# Patient Record
Sex: FEMALE | Race: WHITE
[De-identification: names, ages, dates, MRNs, and addresses within clinical notes are randomized per-mention and may not be internally consistent; named-entity substitution may affect disease eponyms.]

---

## 2019-01-29 ENCOUNTER — HOSPITAL ENCOUNTER (OUTPATIENT)
Dept: HOSPITAL 19 - COL.LAB | Age: 54
End: 2019-01-29
Attending: INTERNAL MEDICINE
Payer: OTHER GOVERNMENT

## 2019-01-29 DIAGNOSIS — E03.9: Primary | ICD-10-CM

## 2019-01-29 LAB — TSH SERPL DL<=0.005 MIU/L-ACNC: 0.03 UIU/ML (ref 0.47–4.68)

## 2019-04-03 ENCOUNTER — HOSPITAL ENCOUNTER (OUTPATIENT)
Dept: HOSPITAL 19 - COL.LAB | Age: 54
End: 2019-04-03
Attending: INTERNAL MEDICINE
Payer: OTHER GOVERNMENT

## 2019-04-03 DIAGNOSIS — E03.9: Primary | ICD-10-CM

## 2019-04-03 LAB — TSH SERPL DL<=0.005 MIU/L-ACNC: 1.97 UIU/ML (ref 0.47–4.68)

## 2023-04-30 ENCOUNTER — HOSPITAL ENCOUNTER (INPATIENT)
Dept: HOSPITAL 19 - MEDICAL | Age: 58
LOS: 4 days | Discharge: HOME | DRG: 683 | End: 2023-05-04
Attending: INTERNAL MEDICINE | Admitting: INTERNAL MEDICINE
Payer: OTHER GOVERNMENT

## 2023-04-30 VITALS — TEMPERATURE: 98.3 F | SYSTOLIC BLOOD PRESSURE: 150 MMHG | DIASTOLIC BLOOD PRESSURE: 67 MMHG | HEART RATE: 71 BPM

## 2023-04-30 VITALS — BODY MASS INDEX: 34.25 KG/M2 | WEIGHT: 188.5 LBS | HEIGHT: 62.01 IN

## 2023-04-30 DIAGNOSIS — E66.9: ICD-10-CM

## 2023-04-30 DIAGNOSIS — E03.9: ICD-10-CM

## 2023-04-30 DIAGNOSIS — E78.5: ICD-10-CM

## 2023-04-30 DIAGNOSIS — Z87.01: ICD-10-CM

## 2023-04-30 DIAGNOSIS — T39.395A: ICD-10-CM

## 2023-04-30 DIAGNOSIS — Z79.890: ICD-10-CM

## 2023-04-30 DIAGNOSIS — E87.1: ICD-10-CM

## 2023-04-30 DIAGNOSIS — E83.39: ICD-10-CM

## 2023-04-30 DIAGNOSIS — I10: ICD-10-CM

## 2023-04-30 DIAGNOSIS — T39.315A: ICD-10-CM

## 2023-04-30 DIAGNOSIS — E11.9: ICD-10-CM

## 2023-04-30 DIAGNOSIS — N17.0: Primary | ICD-10-CM

## 2023-04-30 DIAGNOSIS — E86.1: ICD-10-CM

## 2023-04-30 LAB
ALBUMIN SERPL-MCNC: 3.2 GM/DL (ref 3.5–5)
ANION GAP SERPL CALC-SCNC: 12 MMOL/L (ref 7–16)
BUN SERPL-MCNC: 26 MG/DL (ref 10–20)
CALCIUM SERPL-MCNC: 8.8 MG/DL (ref 8.4–10.2)
CHLORIDE SERPL-SCNC: 103 MMOL/L (ref 98–107)
CO2 SERPL-SCNC: 20 MMOL/L (ref 22–29)
CREAT SERPL-SCNC: 3.96 MG/DL (ref 0.57–1.11)
GLUCOSE SERPL-MCNC: 118 MG/DL (ref 70–99)
MAGNESIUM SERPL-MCNC: 1.8 MG/DL (ref 1.6–2.6)
PHOSPHATE SERPL-MCNC: 5.3 MG/DL (ref 2.3–4.7)
POTASSIUM SERPL-SCNC: 4.3 MMOL/L (ref 3.5–4.5)
SODIUM SERPL-SCNC: 135 MMOL/L (ref 136–145)

## 2023-04-30 PROCEDURE — G0379 DIRECT REFER HOSPITAL OBSERV: HCPCS

## 2023-05-01 VITALS — SYSTOLIC BLOOD PRESSURE: 135 MMHG | HEART RATE: 65 BPM | TEMPERATURE: 97.6 F | DIASTOLIC BLOOD PRESSURE: 63 MMHG

## 2023-05-01 VITALS — TEMPERATURE: 97.8 F | HEART RATE: 72 BPM | DIASTOLIC BLOOD PRESSURE: 66 MMHG | SYSTOLIC BLOOD PRESSURE: 141 MMHG

## 2023-05-01 VITALS — SYSTOLIC BLOOD PRESSURE: 136 MMHG | TEMPERATURE: 98.1 F | DIASTOLIC BLOOD PRESSURE: 61 MMHG | HEART RATE: 66 BPM

## 2023-05-01 VITALS — TEMPERATURE: 97.9 F | HEART RATE: 67 BPM | SYSTOLIC BLOOD PRESSURE: 131 MMHG | DIASTOLIC BLOOD PRESSURE: 56 MMHG

## 2023-05-01 VITALS — TEMPERATURE: 98 F | HEART RATE: 74 BPM | DIASTOLIC BLOOD PRESSURE: 72 MMHG | SYSTOLIC BLOOD PRESSURE: 149 MMHG

## 2023-05-01 VITALS — SYSTOLIC BLOOD PRESSURE: 132 MMHG | HEART RATE: 72 BPM | DIASTOLIC BLOOD PRESSURE: 59 MMHG | TEMPERATURE: 98.1 F

## 2023-05-01 VITALS — SYSTOLIC BLOOD PRESSURE: 141 MMHG

## 2023-05-01 VITALS — SYSTOLIC BLOOD PRESSURE: 150 MMHG

## 2023-05-01 VITALS — SYSTOLIC BLOOD PRESSURE: 135 MMHG

## 2023-05-01 VITALS — SYSTOLIC BLOOD PRESSURE: 132 MMHG

## 2023-05-01 LAB
ANION GAP SERPL CALC-SCNC: 9 MMOL/L (ref 7–16)
BASOPHILS # BLD: 0.1 K/MM3 (ref 0–0.2)
BASOPHILS NFR BLD AUTO: 0.9 % (ref 0–2)
BUN SERPL-MCNC: 26 MG/DL (ref 10–20)
CALCIUM SERPL-MCNC: 8.4 MG/DL (ref 8.4–10.2)
CHLORIDE SERPL-SCNC: 106 MMOL/L (ref 98–107)
CO2 SERPL-SCNC: 22 MMOL/L (ref 22–29)
CREAT SERPL-SCNC: 3.84 MG/DL (ref 0.57–1.11)
CREAT SERPL-SCNC: 4.01 MG/DL (ref 0.57–1.11)
EOSINOPHIL # BLD: 0.7 K/MM3 (ref 0–0.7)
EOSINOPHIL NFR BLD: 9.1 % (ref 0–4)
ERYTHROCYTE [DISTWIDTH] IN BLOOD BY AUTOMATED COUNT: 12 % (ref 11.5–14.5)
FRACT EXCRET NA UR+SERPL-RTO: 2.26 %
GLUCOSE SERPL-MCNC: 102 MG/DL (ref 70–99)
GRANULOCYTES # BLD AUTO: 46 % (ref 42.2–75.2)
HCT VFR BLD AUTO: 32.7 % (ref 37–47)
HGB BLD-MCNC: 11.6 G/DL (ref 12.5–16)
LYMPHOCYTES # BLD: 2.5 K/MM3 (ref 1.2–3.4)
LYMPHOCYTES NFR BLD: 33.9 % (ref 20–51)
MCH RBC QN AUTO: 30 PG (ref 27–31)
MCHC RBC AUTO-ENTMCNC: 36 G/DL (ref 33–37)
MCV RBC AUTO: 84 FL (ref 80–100)
MONOCYTES # BLD: 0.7 K/MM3 (ref 0.1–0.6)
MONOCYTES NFR BLD AUTO: 8.9 % (ref 1.7–9.3)
NEUTROPHILS # BLD: 3.4 K/MM3 (ref 1.4–6.5)
PH UR STRIP.AUTO: 5 [PH] (ref 5–8.5)
PLATELET # BLD AUTO: 196 K/MM3 (ref 130–400)
PMV BLD AUTO: 10.3 FL (ref 7.4–10.4)
POTASSIUM SERPL-SCNC: 4 MMOL/L (ref 3.5–4.5)
RBC # BLD AUTO: 3.91 M/MM3 (ref 4.1–5.3)
RBC # UR STRIP.AUTO: (no result) /UL
RBC # UR: (no result) /HPF (ref 0–2)
SODIUM SERPL-SCNC: 137 MMOL/L (ref 136–145)
SODIUM SERPL-SCNC: 139 MMOL/L (ref 136–145)
SP GR UR STRIP.AUTO: 1.01 (ref 1–1.03)
SQUAMOUS # URNS: (no result) /HPF (ref 0–10)
URN COLLECT METHOD CLASS: (no result)
UROBILINOGEN UR STRIP.AUTO-MCNC: 0.2 E.U/DL (ref 0.2–1)
WBC # UR: (no result) /HPF (ref 0–2)

## 2023-05-01 NOTE — NUR
Patient resting in bed with minimal complaints today. Nausea improved as the
day progressed. She tolerated lunch and dinner. Ivf as ordered. Labs collected
as ordered. Minmal needs. Report to ailyn

## 2023-05-01 NOTE — NUR
PT. STATED THAT SHE WAS STILL HAVING SOME NAUSEA WHEN I WENT IN TO CHECK ON
HER, I LET HER KNOW THAT I COULD GET HER A DOSE OF IV ZOFRAN TO HELP WITH THE
NAUSEA, AFTER GIVING HERTHE DOSE I INFORMED HER THAT IT WOULD TAKE BETWEEN
15-20 MIN TO KICK IN, AND THAT I WOULD CHECK ON HER IN A LITTLE BIT TO SEE IF
IT HAD BEEN EFFECTIVE, BUT THAT IF SHE WAS STILL HAVING NAUSEA AT THAT TIME I
COULD CHECK WITH THE  ON CALL TONIGHT TO SEE ABOUT ORDERING A DIFFERENT
TYPE OF NAUSEA MEDICINE, THAT WAY WE WOULD BE ABLE TO ALTERNATE NAUSEA MEDS,
DESPITE NAUSEA PT. STATES SHE HASN'T BEEN ABLE TO THROW UP, WILL CONTINUE TO
MONITOR.

## 2023-05-01 NOTE — NUR
Patient resting in bed. attempting to eat toast, but reports nausea. Zofran
given for nausea. She also reports a headache, tylenol PRN given. We discussed
strict I&O due to her kidney function, she verbalized understanding. Gi sample
sent to lab by [facility]. Will monitor

## 2023-05-01 NOTE — NUR
Initial visit; Patient thanked  for looking in on her and offering
God's blessings though patient declined spiritual care.  joked with
her about her funny shirt and wished her well.

## 2023-05-01 NOTE — NUR
ANAND met with the patient to discuss discharge plan. The patient lives in [place]
with her , Simone (ph#[phone]). She reports independence with
ADLs and does not have any DME. The patient's PCP is Dr. Duran at [facility]
and she obtains her medications at [facility]. The patient does not have a DPOA-HC,
but she was interested in obtaining a form. ANAND provided. The patient plans to
return home with her  upon discharge. No additional needs at this time.
 
*Discharge plan: home with *

## 2023-05-02 VITALS — SYSTOLIC BLOOD PRESSURE: 129 MMHG | TEMPERATURE: 97.8 F | DIASTOLIC BLOOD PRESSURE: 63 MMHG | HEART RATE: 67 BPM

## 2023-05-02 VITALS — DIASTOLIC BLOOD PRESSURE: 70 MMHG | SYSTOLIC BLOOD PRESSURE: 149 MMHG | TEMPERATURE: 97.9 F | HEART RATE: 69 BPM

## 2023-05-02 VITALS — SYSTOLIC BLOOD PRESSURE: 134 MMHG

## 2023-05-02 VITALS — SYSTOLIC BLOOD PRESSURE: 134 MMHG | HEART RATE: 67 BPM | TEMPERATURE: 98.4 F | DIASTOLIC BLOOD PRESSURE: 59 MMHG

## 2023-05-02 VITALS — DIASTOLIC BLOOD PRESSURE: 78 MMHG | TEMPERATURE: 98.4 F | HEART RATE: 81 BPM | SYSTOLIC BLOOD PRESSURE: 152 MMHG

## 2023-05-02 VITALS — HEART RATE: 68 BPM | SYSTOLIC BLOOD PRESSURE: 154 MMHG | DIASTOLIC BLOOD PRESSURE: 80 MMHG | TEMPERATURE: 97.8 F

## 2023-05-02 VITALS — SYSTOLIC BLOOD PRESSURE: 152 MMHG

## 2023-05-02 VITALS — HEART RATE: 65 BPM | SYSTOLIC BLOOD PRESSURE: 148 MMHG | DIASTOLIC BLOOD PRESSURE: 66 MMHG | TEMPERATURE: 97.9 F

## 2023-05-02 VITALS — SYSTOLIC BLOOD PRESSURE: 149 MMHG | TEMPERATURE: 97.9 F | DIASTOLIC BLOOD PRESSURE: 70 MMHG | HEART RATE: 69 BPM

## 2023-05-02 VITALS — SYSTOLIC BLOOD PRESSURE: 149 MMHG

## 2023-05-02 VITALS — SYSTOLIC BLOOD PRESSURE: 148 MMHG

## 2023-05-02 LAB
ANION GAP SERPL CALC-SCNC: 9 MMOL/L (ref 7–16)
BASOPHILS # BLD: 0.1 K/MM3 (ref 0–0.2)
BASOPHILS NFR BLD AUTO: 1 % (ref 0–2)
BUN SERPL-MCNC: 23 MG/DL (ref 10–20)
CALCIUM SERPL-MCNC: 8.4 MG/DL (ref 8.4–10.2)
CHLORIDE SERPL-SCNC: 110 MMOL/L (ref 98–107)
CO2 SERPL-SCNC: 20 MMOL/L (ref 22–29)
CREAT SERPL-SCNC: 3.54 MG/DL (ref 0.57–1.11)
EOSINOPHIL # BLD: 0.4 K/MM3 (ref 0–0.7)
EOSINOPHIL NFR BLD: 6.4 % (ref 0–4)
ERYTHROCYTE [DISTWIDTH] IN BLOOD BY AUTOMATED COUNT: 12.2 % (ref 11.5–14.5)
GLUCOSE SERPL-MCNC: 97 MG/DL (ref 70–99)
GRANULOCYTES # BLD AUTO: 47.8 % (ref 42.2–75.2)
HCT VFR BLD AUTO: 31.1 % (ref 37–47)
HGB BLD-MCNC: 10.7 G/DL (ref 12.5–16)
LYMPHOCYTES # BLD: 2.2 K/MM3 (ref 1.2–3.4)
LYMPHOCYTES NFR BLD: 35.2 % (ref 20–51)
MCH RBC QN AUTO: 29 PG (ref 27–31)
MCHC RBC AUTO-ENTMCNC: 34 G/DL (ref 33–37)
MCV RBC AUTO: 84 FL (ref 80–100)
MONOCYTES # BLD: 0.5 K/MM3 (ref 0.1–0.6)
MONOCYTES NFR BLD AUTO: 8.5 % (ref 1.7–9.3)
NEUTROPHILS # BLD: 3 K/MM3 (ref 1.4–6.5)
PLATELET # BLD AUTO: 173 K/MM3 (ref 130–400)
PMV BLD AUTO: 10.4 FL (ref 7.4–10.4)
POTASSIUM SERPL-SCNC: 4.3 MMOL/L (ref 3.5–4.5)
RBC # BLD AUTO: 3.69 M/MM3 (ref 4.1–5.3)
SODIUM SERPL-SCNC: 139 MMOL/L (ref 136–145)

## 2023-05-02 NOTE — NUR
PATIENT ALERT AND ORIENTED X4. VSS. PATIENT HERE FOR ZAINAB/PNA/N/V. PATIENT
DENIES ANY PAIN AT THIS TIME. IV TO RIGHT AC WITH NS RUNNING AT 125ML/HOUR.
PATIENT IN BED WITH CALL LIGHT NEAR.

## 2023-05-03 VITALS — HEART RATE: 76 BPM | DIASTOLIC BLOOD PRESSURE: 66 MMHG | TEMPERATURE: 97.8 F | SYSTOLIC BLOOD PRESSURE: 154 MMHG

## 2023-05-03 VITALS — HEART RATE: 69 BPM | SYSTOLIC BLOOD PRESSURE: 148 MMHG | TEMPERATURE: 97.9 F | DIASTOLIC BLOOD PRESSURE: 60 MMHG

## 2023-05-03 VITALS — DIASTOLIC BLOOD PRESSURE: 65 MMHG | SYSTOLIC BLOOD PRESSURE: 148 MMHG | HEART RATE: 72 BPM | TEMPERATURE: 98.1 F

## 2023-05-03 VITALS — HEART RATE: 73 BPM | DIASTOLIC BLOOD PRESSURE: 79 MMHG | SYSTOLIC BLOOD PRESSURE: 177 MMHG | TEMPERATURE: 97.9 F

## 2023-05-03 VITALS — DIASTOLIC BLOOD PRESSURE: 82 MMHG | SYSTOLIC BLOOD PRESSURE: 157 MMHG

## 2023-05-03 VITALS — HEART RATE: 71 BPM | DIASTOLIC BLOOD PRESSURE: 77 MMHG | SYSTOLIC BLOOD PRESSURE: 165 MMHG | TEMPERATURE: 98 F

## 2023-05-03 VITALS — DIASTOLIC BLOOD PRESSURE: 70 MMHG | SYSTOLIC BLOOD PRESSURE: 137 MMHG | TEMPERATURE: 98 F | HEART RATE: 78 BPM

## 2023-05-03 VITALS — SYSTOLIC BLOOD PRESSURE: 154 MMHG

## 2023-05-03 VITALS — SYSTOLIC BLOOD PRESSURE: 177 MMHG

## 2023-05-03 VITALS — SYSTOLIC BLOOD PRESSURE: 152 MMHG

## 2023-05-03 LAB
ANION GAP SERPL CALC-SCNC: 9 MMOL/L (ref 7–16)
BUN SERPL-MCNC: 17 MG/DL (ref 10–20)
CALCIUM SERPL-MCNC: 8.7 MG/DL (ref 8.4–10.2)
CHLORIDE SERPL-SCNC: 111 MMOL/L (ref 98–107)
CO2 SERPL-SCNC: 19 MMOL/L (ref 22–29)
CREAT SERPL-SCNC: 2.57 MG/DL (ref 0.57–1.11)
EOSINOPHIL NFR BLD: 2 % (ref 0–4)
ERYTHROCYTE [DISTWIDTH] IN BLOOD BY AUTOMATED COUNT: 12.2 % (ref 11.5–14.5)
GLUCOSE SERPL-MCNC: 94 MG/DL (ref 70–99)
HCT VFR BLD AUTO: 33.4 % (ref 37–47)
HGB BLD-MCNC: 11.4 G/DL (ref 12.5–16)
LYMPHOCYTES NFR BLD MANUAL: 29 % (ref 20–51)
MCH RBC QN AUTO: 29 PG (ref 27–31)
MCHC RBC AUTO-ENTMCNC: 34 G/DL (ref 33–37)
MCV RBC AUTO: 85 FL (ref 80–100)
MONOCYTES NFR BLD: 4 % (ref 1.7–9.3)
NEUTS SEG NFR BLD MANUAL: 65 % (ref 42–75.2)
PLATELET # BLD AUTO: 193 K/MM3 (ref 130–400)
PLATELET BLD QL SMEAR: NORMAL
PMV BLD AUTO: 10.7 FL (ref 7.4–10.4)
POTASSIUM SERPL-SCNC: 4.3 MMOL/L (ref 3.5–4.5)
RBC # BLD AUTO: 3.92 M/MM3 (ref 4.1–5.3)
SODIUM SERPL-SCNC: 139 MMOL/L (ref 136–145)

## 2023-05-03 NOTE — NUR
PT SITTING UP IN RECLINER. VISITING WITH FRIEND.  DENIES COUGH, PAIN OR
DISTRESS. CALL LIGHT IN REACH.

## 2023-05-03 NOTE — NUR
05/02 2212 PT. RESTING COMFORTABLY IN BED, ASKED HER IF ANYONE HAD EXPLAINED
TO HER WHY HER HOME MEDICATIONS WERE ON HOLD, INFORMED HER ABOUT METFORMIN,
AND KIDNEY FUNCTION, AND THAT THEY ARE MOST LIKELY HOLDING THAT AND HER OTHER
HOME MEDS WILE WE ARE WAITING FOR HER KIDNEY LABS TO CONTINUE TO TREND
DOWNWARD, PT. STATED UNDERSTANDING, WILL CONTINUE TO MONITOR.

## 2023-05-03 NOTE — NUR
PATIENT ALERT AND ORIENTED X4. VSS. PATIENT HERE FOR ZAINAB/PNA/N/V. PATIENT
DENIES ANY PAIN, N/V. IV TO RIGHT AC WITH NS RUNNING AT 100ML/HOUR. LUNGS CTA.
PATIENT ON ROOM AIR. PATIENT EATING/DRINKING AND VOIDING WITHOUT ISSUES. CALL
LIGHT IN REACH .

## 2023-05-04 VITALS — SYSTOLIC BLOOD PRESSURE: 136 MMHG | DIASTOLIC BLOOD PRESSURE: 70 MMHG | HEART RATE: 70 BPM | TEMPERATURE: 98 F

## 2023-05-04 VITALS — SYSTOLIC BLOOD PRESSURE: 136 MMHG

## 2023-05-04 VITALS — SYSTOLIC BLOOD PRESSURE: 184 MMHG

## 2023-05-04 VITALS — TEMPERATURE: 98.4 F | HEART RATE: 74 BPM | DIASTOLIC BLOOD PRESSURE: 73 MMHG | SYSTOLIC BLOOD PRESSURE: 184 MMHG

## 2023-05-04 VITALS — SYSTOLIC BLOOD PRESSURE: 137 MMHG

## 2023-05-04 LAB
ANION GAP SERPL CALC-SCNC: 8 MMOL/L (ref 7–16)
BASOPHILS NFR BLD MANUAL: 1 % (ref 0–2)
BUN SERPL-MCNC: 15 MG/DL (ref 10–20)
CALCIUM SERPL-MCNC: 8.8 MG/DL (ref 8.4–10.2)
CHLORIDE SERPL-SCNC: 109 MMOL/L (ref 98–107)
CO2 SERPL-SCNC: 23 MMOL/L (ref 22–29)
CREAT SERPL-SCNC: 2.01 MG/DL (ref 0.57–1.11)
EOSINOPHIL NFR BLD: 13 % (ref 0–4)
ERYTHROCYTE [DISTWIDTH] IN BLOOD BY AUTOMATED COUNT: 12.1 % (ref 11.5–14.5)
GLUCOSE SERPL-MCNC: 95 MG/DL (ref 70–99)
HCT VFR BLD AUTO: 30.9 % (ref 37–47)
HGB BLD-MCNC: 10.6 G/DL (ref 12.5–16)
LYMPHOCYTES NFR BLD MANUAL: 25 % (ref 20–51)
MCH RBC QN AUTO: 29 PG (ref 27–31)
MCHC RBC AUTO-ENTMCNC: 34 G/DL (ref 33–37)
MCV RBC AUTO: 84 FL (ref 80–100)
MONOCYTES NFR BLD: 6 % (ref 1.7–9.3)
NEUTS BAND NFR BLD: 6 % (ref 0–10)
NEUTS SEG NFR BLD MANUAL: 49 % (ref 42–75.2)
PLATELET # BLD AUTO: 202 K/MM3 (ref 130–400)
PLATELET BLD QL SMEAR: NORMAL
PMV BLD AUTO: 10.5 FL (ref 7.4–10.4)
POTASSIUM SERPL-SCNC: 4 MMOL/L (ref 3.5–4.5)
RBC # BLD AUTO: 3.67 M/MM3 (ref 4.1–5.3)
SODIUM SERPL-SCNC: 140 MMOL/L (ref 136–145)

## 2023-05-04 NOTE — NUR
Pt. has med discharge criteria.  INT discontinued from Rt. ac.  Gave and
reviewed discharge paperwork with the pt.  Pt. voices understanding.  Pt.
escorted out by this nurse.

## 2023-05-04 NOTE — NUR
Pt. sitting up in bed. Pt. is a&OX3, assessment complete. INT to rt. ac
patent.  Pt. denies pain or other needs, call light within reach.